# Patient Record
Sex: FEMALE | URBAN - METROPOLITAN AREA
[De-identification: names, ages, dates, MRNs, and addresses within clinical notes are randomized per-mention and may not be internally consistent; named-entity substitution may affect disease eponyms.]

---

## 2022-06-28 ENCOUNTER — OFFICE VISIT (OUTPATIENT)
Dept: URBAN - METROPOLITAN AREA CLINIC 71 | Facility: CLINIC | Age: 52
End: 2022-06-28

## 2022-06-28 DIAGNOSIS — H25.013 CORTICAL AGE-RELATED CATARACT, BILATERAL: Primary | ICD-10-CM

## 2022-06-28 DIAGNOSIS — C78.00 SECONDARY LUNG CANCER: ICD-10-CM

## 2022-06-28 DIAGNOSIS — C79.9 METASTATIC CANCER: ICD-10-CM

## 2022-06-28 DIAGNOSIS — C34.90 LUNG CANCER NOS: ICD-10-CM

## 2022-06-28 PROCEDURE — 92012 INTRM OPH EXAM EST PATIENT: CPT | Performed by: OPHTHALMOLOGY

## 2022-06-28 RX ORDER — OSIMERTINIB 80 1/1
80 MG TABLET, FILM COATED ORAL
Qty: 0 | Refills: 0 | Status: ACTIVE
Start: 2022-06-28

## 2022-06-28 RX ORDER — IBUPROFEN 200 MG/1
200 MG TABLET, FILM COATED ORAL
Qty: 0 | Refills: 0 | Status: ACTIVE
Start: 2022-06-28

## 2022-06-28 ASSESSMENT — VISUAL ACUITY
OS: 20/20
OD: 20/20

## 2022-06-28 ASSESSMENT — INTRAOCULAR PRESSURE
OS: 10
OD: 9

## 2022-06-28 NOTE — IMPRESSION/PLAN
Impression: Cortical age-related cataract, bilateral: H25.013. Plan: Discussed with patient in detail, recommend updating her glasses.

## 2022-06-28 NOTE — IMPRESSION/PLAN
Impression: Metastatic cancer: C79.9. Plan: Parietal/Cortical Brain Mets witout ocular manifestations.  Visual Field Normal.